# Patient Record
Sex: MALE | Race: WHITE | NOT HISPANIC OR LATINO | Employment: FULL TIME | URBAN - METROPOLITAN AREA
[De-identification: names, ages, dates, MRNs, and addresses within clinical notes are randomized per-mention and may not be internally consistent; named-entity substitution may affect disease eponyms.]

---

## 2022-12-28 ENCOUNTER — OFFICE VISIT (OUTPATIENT)
Dept: URGENT CARE | Facility: CLINIC | Age: 32
End: 2022-12-28

## 2022-12-28 VITALS
HEART RATE: 76 BPM | BODY MASS INDEX: 44.1 KG/M2 | TEMPERATURE: 98.7 F | WEIGHT: 315 LBS | RESPIRATION RATE: 14 BRPM | HEIGHT: 71 IN | OXYGEN SATURATION: 99 %

## 2022-12-28 DIAGNOSIS — L30.9 DERMATITIS: Primary | ICD-10-CM

## 2022-12-28 RX ORDER — PREDNISONE 20 MG/1
TABLET ORAL
Qty: 20 TABLET | Refills: 0 | Status: SHIPPED | OUTPATIENT
Start: 2022-12-28 | End: 2023-01-09

## 2022-12-28 RX ORDER — ESCITALOPRAM OXALATE 10 MG/1
10 TABLET ORAL DAILY
COMMUNITY
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
## 2023-12-27 ENCOUNTER — OFFICE VISIT (OUTPATIENT)
Dept: PHYSICAL THERAPY | Facility: CLINIC | Age: 33
End: 2023-12-27
Payer: COMMERCIAL

## 2023-12-27 DIAGNOSIS — M54.6 ACUTE BILATERAL THORACIC BACK PAIN: Primary | ICD-10-CM

## 2023-12-27 PROCEDURE — 97161 PT EVAL LOW COMPLEX 20 MIN: CPT | Performed by: PHYSICAL THERAPIST
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
## 2024-01-04 ENCOUNTER — OFFICE VISIT (OUTPATIENT)
Dept: PHYSICAL THERAPY | Facility: CLINIC | Age: 34
End: 2024-01-04
Payer: COMMERCIAL

## 2024-01-04 DIAGNOSIS — M54.6 ACUTE BILATERAL THORACIC BACK PAIN: Primary | ICD-10-CM

## 2024-01-04 PROCEDURE — 97110 THERAPEUTIC EXERCISES: CPT | Performed by: PHYSICAL THERAPIST

## 2024-01-04 NOTE — PROGRESS NOTES
Daily Note     Today's date: 2024  Patient name: Mehran Curran  : 1990  MRN: [id number]  Referring provider: Vitaly Spicer  Dx:   Encounter Diagnosis     ICD-10-CM    1. Acute bilateral thoracic back pain  M54.6                      Subjective: Pt reports he is overall doing alright, he has 4/10 pain at present across midback. Reports he is doing primarily seated thoracic stretches. He acknowledges he is able to correct his posture but often finds he needs to correct it as he has absent mindedly resolved into a slouch.       Objective: See treatment diary below. Added strengthening for postural stabilizers, HEP updated.       Assessment: Tolerated treatment well. Patient symptoms abolish with rep thoracic ext in sitting with fulcrum in lower thoracic spine. Pt demo great technique with standing therex and ongoing good prognosis.       Plan: Continue per plan of care.      Eval/ Re-eval Auth #/ Referral # Total visits Start date  Expiration date Total active units  Total manual units  PT only or  PT+OT?    requested                                                        Date:           Total authorized units:  Active:            Manual:           Total remaining units:  Active:               Manual:                Date:           Total authorized units: Active:            Manual:           Total remaining units:  Active:               Manual:                    Precautions: Standard.       Visit 1 2      24                          Neuro Re-Ed       Posture Edu rev     SOC x10 Rev                                        Ther Ex       Rep TS ext Seated x10    Standing elbows up wall x10 Seated 3x10    Standing 1x10     Rows  Black TB 2x12     LPD  Blue 2x12                                        Ther Activity                                          Modalities

## 2024-01-09 ENCOUNTER — OFFICE VISIT (OUTPATIENT)
Dept: PHYSICAL THERAPY | Facility: CLINIC | Age: 34
End: 2024-01-09
Payer: COMMERCIAL

## 2024-01-09 DIAGNOSIS — M54.6 ACUTE BILATERAL THORACIC BACK PAIN: Primary | ICD-10-CM

## 2024-01-09 PROCEDURE — 97110 THERAPEUTIC EXERCISES: CPT | Performed by: PHYSICAL THERAPIST

## 2024-01-09 NOTE — PROGRESS NOTES
Daily Note     Today's date: 2024  Patient name: Mehran Curran  : 1990  MRN: [id number]  Referring provider: Vitaly Spicer  Dx:   Encounter Diagnosis     ICD-10-CM    1. Acute bilateral thoracic back pain  M54.6                      Subjective: Pt reports he felt sore after last session adding new stretches, even over the weekend not at work. It leaves him with a sensation that he wants to stretch or crack his back.       Objective: See treatment diary below, baseline 5/10 midline (just off to right) upper lumbar lower thoracic discomfort. Added open books to HEP.       Assessment: Tolerated treatment well. Post session pain is absent but exercise soreness noticeable.       Plan: Continue per plan of care.      Eval/ Re-eval Auth #/ Referral # Total visits Start date  Expiration date Total active units  Total manual units  PT only or  PT+OT?    requested                                                        Date:           Total authorized units:  Active:            Manual:           Total remaining units:  Active:               Manual:                Date:           Total authorized units: Active:            Manual:           Total remaining units:  Active:               Manual:                    Precautions: Standard.       Visit 1 2 3     24                         Neuro Re-Ed       Posture Edu rev     SOC x10 Rev                                        Ther Ex       Rep TS ext Seated x10    Standing elbows up wall x10 Seated 3x10    Standing 1x10 Seated 3x10    Standing with straight arms 2x10    Rows  Black TB 2x12 1x12    LPD  Blue 2x12 1x12    Supine OH str over fulcrum   5s x 10 1 towel roll    Open books   X10 ea                         Ther Activity                                          Modalities

## 2024-01-16 ENCOUNTER — OFFICE VISIT (OUTPATIENT)
Dept: PHYSICAL THERAPY | Facility: CLINIC | Age: 34
End: 2024-01-16
Payer: COMMERCIAL

## 2024-01-16 DIAGNOSIS — M54.6 ACUTE BILATERAL THORACIC BACK PAIN: Primary | ICD-10-CM

## 2024-01-16 PROCEDURE — 97110 THERAPEUTIC EXERCISES: CPT | Performed by: PHYSICAL THERAPIST

## 2024-01-16 NOTE — PROGRESS NOTES
Daily Note     Today's date: 2024  Patient name: Mehran Curran  : 1990  MRN: [id number]  Referring provider: Vitaly Spicer  Dx:   Encounter Diagnosis     ICD-10-CM    1. Acute bilateral thoracic back pain  M54.6                      Subjective: Pt reports he was not very active all weekend, he thought his back was bothering him more this weekend. Pt would notice discomfort upon waking and then also after a nap during day (pt uses a CPAP so he sleeps on his back for the most part).       Objective: See treatment diary below. Symptoms consistently reduce and do not return with rep TS ext in sitting. Therex progressed today. Add supine B shldr flexion to HEP 3x/week.       Assessment: Tolerated treatment well. Patient cont to demo good tolerance with therex through session, and good overall response to POC. Ongoing symptoms outside of clinic correlate to sustained postures outside of ideal sitting alignment.       Plan: Continue per plan of care.      Eval/ Re-eval Auth #/ Referral # Total visits Start date  Expiration date Total active units  Total manual units  PT only or  PT+OT?    Auth #[id number] approved. 12 visits. 23 to 3/27/24                      Precautions: Standard.       Visit 1 2 3 4    24                        Neuro Re-Ed       Posture Edu rev     SOC x10 Rev                                        Ther Ex       Rep TS ext Seated x10    Standing elbows up wall x10 Seated 3x10    Standing 1x10 Seated 3x10    Standing with straight arms 2x10 Seated 3x10 t/o session   Rows  Black TB 2x12 1x12 2x10 CC 12.5lbs   LPD  Blue 2x12 1x12 2x10 CC 12.5lbs   Supine OH str over fulcrum   5s x 10 1 towel roll 5s x 10 4lbs on wand   Open books   X10 ea 2x10 ea   Pallof press    7.5lbs CC 2x10 ea   Wall slide lift offs    2x12          Ther Activity       Suitcase carry     18lb ' ea                               Modalities

## 2024-01-18 ENCOUNTER — OFFICE VISIT (OUTPATIENT)
Dept: URGENT CARE | Facility: CLINIC | Age: 34
End: 2024-01-18
Payer: COMMERCIAL

## 2024-01-18 ENCOUNTER — APPOINTMENT (OUTPATIENT)
Dept: PHYSICAL THERAPY | Facility: CLINIC | Age: 34
End: 2024-01-18
Payer: COMMERCIAL

## 2024-01-18 VITALS
BODY MASS INDEX: 46.86 KG/M2 | WEIGHT: 315 LBS | RESPIRATION RATE: 16 BRPM | HEART RATE: 79 BPM | TEMPERATURE: 98.7 F | OXYGEN SATURATION: 97 %

## 2024-01-18 DIAGNOSIS — L72.9 CYST OF SKIN: Primary | ICD-10-CM

## 2024-01-18 PROCEDURE — 99213 OFFICE O/P EST LOW 20 MIN: CPT | Performed by: PHYSICIAN ASSISTANT

## 2024-01-18 RX ORDER — SEMAGLUTIDE 2.68 MG/ML
INJECTION, SOLUTION SUBCUTANEOUS
COMMUNITY
Start: 2023-12-26

## 2024-01-18 RX ORDER — SULFAMETHOXAZOLE AND TRIMETHOPRIM 800; 160 MG/1; MG/1
1 TABLET ORAL EVERY 12 HOURS SCHEDULED
Qty: 10 TABLET | Refills: 0 | Status: SHIPPED | OUTPATIENT
Start: 2024-01-18 | End: 2024-01-23

## 2024-01-18 RX ORDER — SULFAMETHOXAZOLE AND TRIMETHOPRIM 800; 160 MG/1; MG/1
1 TABLET ORAL EVERY 12 HOURS SCHEDULED
Qty: 10 TABLET | Refills: 0 | Status: SHIPPED | OUTPATIENT
Start: 2024-01-18 | End: 2024-01-18

## 2024-01-18 RX ORDER — SEMAGLUTIDE 1.34 MG/ML
INJECTION, SOLUTION SUBCUTANEOUS
COMMUNITY
Start: 2024-01-02

## 2024-01-19 NOTE — PROGRESS NOTES
[facility]        NAME: Mehran Curran is a 33 y.o. male  : 1990    MRN: [id number]  DATE: 2024  TIME: 8:01 PM    Assessment and Plan   Cyst of skin [L72.9]  1. Cyst of skin  General Surgery    sulfamethoxazole-trimethoprim (BACTRIM DS) 800-160 mg per tablet    DISCONTINUED: sulfamethoxazole-trimethoprim (BACTRIM DS) 800-160 mg per tablet        Suspect a cyst on the patient's back.  Recommend follow-up with general surgery or Derm for removal.  Will do Bactrim since the skin is red and patient is worried for infection.    Patient Instructions     Patient Instructions   Follow up with general surgery       Follow up with PCP in 3-5 days.  Proceed to  ER if symptoms worsen.    Chief Complaint     Chief Complaint   Patient presents with    Mass     Pt presents with a cyst on back ongoing for several years; upper back redness, lump noted         History of Present Illness       The patient is a 33-year-old male with a hx of DM presenting today for a cyst on his back that has been ongoing for several years.  Today he noticed that it was red and draining.  Pain is a 2/10.  He also reports noticing a foul odor from his back.  However, he did recently increase his dose of Ozempic and has been experiencing night sweats.  He admits that there has been a clear fluid from the back.  Denies any other symptoms.        Review of Systems   Review of Systems   Constitutional:  Negative for chills and fever.   Skin:  Positive for color change.        Erythema on the patient's back.         Current Medications       Current Outpatient Medications:     escitalopram (LEXAPRO) 10 mg tablet, Take 10 mg by mouth daily, Disp: , Rfl:     Ozempic, 1 MG/DOSE, 4 MG/3ML injection pen, INJECT (1MG) BY SUBCUTANEOUS ROUTE EVERY WEEK ON THE SAME DAY OF EACH WEEK, Disp: , Rfl:     Ozempic, 2 MG/DOSE, 8 MG/3ML injection pen, INJECT (2MG) BY SUBCUTANEOUS ROUTE EVERY WEEK ON THE SAME DAY OF EACH WEEK, Disp: , Rfl:      sulfamethoxazole-trimethoprim (BACTRIM DS) 800-160 mg per tablet, Take 1 tablet by mouth every 12 (twelve) hours for 5 days, Disp: 10 tablet, Rfl: 0    Current Allergies     Allergies as of 01/18/2024 - Reviewed 01/18/2024   Allergen Reaction Noted    Amoxicillin Hives 12/28/2022            The following portions of the patient's history were reviewed and updated as appropriate: allergies, current medications, past family history, past medical history, past social history, past surgical history and problem list.     Past Medical History:   Diagnosis Date    Diabetes mellitus (HCC)     PARDEEP (generalized anxiety disorder)        History reviewed. No pertinent surgical history.    History reviewed. No pertinent family history.      Medications have been verified.        Objective   Pulse 79   Temp 98.7 °F (37.1 °C)   Resp 16   Wt (!) 152 kg (336 lb)   SpO2 97%   BMI 46.86 kg/m²        Physical Exam     Physical Exam  Vitals and nursing note reviewed.   Constitutional:       General: He is not in acute distress.     Appearance: Normal appearance. He is normal weight. He is not ill-appearing, toxic-appearing or diaphoretic.   Cardiovascular:      Rate and Rhythm: Normal rate and regular rhythm.      Heart sounds: No murmur heard.     No friction rub. No gallop.   Pulmonary:      Effort: Pulmonary effort is normal. No respiratory distress.      Breath sounds: Normal breath sounds. No stridor. No wheezing, rhonchi or rales.   Chest:      Chest wall: No tenderness.   Skin:     Findings: Erythema present.      Comments: 2 cm by 2 cm round smooth lump on the pt's back with erythema. Scant clear liquid expressed from the back. No purulent drainage.    Neurological:      Mental Status: He is alert.

## 2024-01-23 ENCOUNTER — APPOINTMENT (OUTPATIENT)
Dept: PHYSICAL THERAPY | Facility: CLINIC | Age: 34
End: 2024-01-23
Payer: COMMERCIAL

## 2024-03-26 ENCOUNTER — EVALUATION (OUTPATIENT)
Dept: PHYSICAL THERAPY | Facility: CLINIC | Age: 34
End: 2024-03-26
Payer: COMMERCIAL

## 2024-03-26 DIAGNOSIS — M54.6 ACUTE BILATERAL THORACIC BACK PAIN: Primary | ICD-10-CM

## 2024-03-26 PROCEDURE — 97110 THERAPEUTIC EXERCISES: CPT | Performed by: PHYSICAL THERAPIST

## 2024-03-26 NOTE — PROGRESS NOTES
Daily Note - Progress Note    Today's date: 3/26/2024  Patient name: Mehran Curran  : 1990  MRN: [id number]  Referring provider: Vitaly Spicer  Dx:   Encounter Diagnosis     ICD-10-CM    1. Acute bilateral thoracic back pain  M54.6                      Subjective: pt has had a hiatus in care because he had a cyst removed and required wound care for extended time period. His did notice a reduction in symptoms temporarily however they have returned as of late with a change in activity sitting in hospital chairs with his father who is ill. Pt has returned to the gym which was an intiial goal. He experiences soreness from the gym, he relates it to work       Objective: See treatment diary below. Thoracic AROM WFL t/o with min loss ext. LTG partially met. Posture nil lordosis, inc thoracic kyphosis. Dec paraspinal strength (+)      Assessment: Tolerated treatment well. Patient demo ongoing need for postural stabilization and maximizing response from repeated motions in directional preference. At this time pt is below his PLOF and while he is indeed improving pt would benefit from ongoing skilled PT to maximize return to PLOF.       Plan: Continue per plan of care.      Eval/ Re-eval Auth #/ Referral # Total visits Start date  Expiration date Total active units  Total manual units  PT only or  PT+OT?    Auth #[id number] approved. 12 visits. 23 to 3/27/24                      Precautions: Standard.       Visit 1 2 3 4 5- PN    12/27 1/4/24 1/9/24 1/16/24 3/26/24                           Neuro Re-Ed        Posture Edu rev   Rev   SOC x10 Rev   Rev                                           Ther Ex        Rep TS ext Seated x10    Standing elbows up wall x10 Seated 3x10    Standing 1x10 Seated 3x10    Standing with straight arms 2x10 Seated 3x10 t/o session 1x10        Standing elbow sup wall 2x10   Rows  Black TB 2x12 1x12 2x10 CC 12.5lbs Uni CC 12.5lbs 2x10 ea   LPD  Blue 2x12 1x12 2x10 CC 12.5lbs 9lbs  2x10 uni ea   Supine OH str over fulcrum   5s x 10 1 towel roll 5s x 10 4lbs on wand    Open books   X10 ea 2x10 ea 2x12 ea   Pallof press    7.5lbs CC 2x10 ea    Wall slide lift offs    2x12            Ther Activity        Suitcase carry     18lb ' ea                                    Modalities

## 2024-04-02 ENCOUNTER — APPOINTMENT (OUTPATIENT)
Dept: PHYSICAL THERAPY | Facility: CLINIC | Age: 34
End: 2024-04-02
Payer: COMMERCIAL

## 2024-04-11 ENCOUNTER — OFFICE VISIT (OUTPATIENT)
Dept: PHYSICAL THERAPY | Facility: CLINIC | Age: 34
End: 2024-04-11
Payer: COMMERCIAL

## 2024-04-11 DIAGNOSIS — M54.6 ACUTE BILATERAL THORACIC BACK PAIN: Primary | ICD-10-CM

## 2024-04-11 PROCEDURE — 97110 THERAPEUTIC EXERCISES: CPT | Performed by: PHYSICAL THERAPIST

## 2024-04-11 NOTE — PROGRESS NOTES
Daily Note     Today's date: 2024  Patient name: Mehran Curran  : 1990  MRN: [id number]  Referring provider: Vitaly Spicer  Dx:   Encounter Diagnosis     ICD-10-CM    1. Acute bilateral thoracic back pain  M54.6                      Subjective: Pt has been feeling good overall without much back pain but does correlate this to not being at work much lately due to a very unfortunate loss of a family member recently. No Pain today. Pt confirms good challenge with therex,       Objective: See treatment diary below      Assessment: Tolerated treatment well. Patient cont to demo good yanet to therex and DP based HEP at this time. Will assess once pt returns to work. Cont to emphasize preventative stretching upon RTW.       Plan:  F/u in 1 week     Eval/ Re-eval Auth #/ Referral # Total visits Start date  Expiration date Total active units  Total manual units  PT only or  PT+OT?    Auth #[id number] approved. 12 visits. 23 to 3/27/24           Auth Extended to 2024            Precautions: Standard.       Visit 1 2 3 4 5- PN 6    12/27 1/4/24 1/9/24 1/16/24 3/26/24 4/11/24                              Neuro Re-Ed         Posture Edu rev   Rev Rev   SOC x10 Rev   Rev                                                 Ther Ex         Rep TS ext Seated x10    Standing elbows up wall x10 Seated 3x10    Standing 1x10 Seated 3x10    Standing with straight arms 2x10 Seated 3x10 t/o session 1x10        Standing elbow sup wall 2x10 X10        2x10   Rows  Black TB 2x12 1x12 2x10 CC 12.5lbs Uni CC 12.5lbs 2x10 ea      LPD  Blue 2x12 1x12 2x10 CC 12.5lbs 9lbs 2x10 uni ea    Supine OH str over fulcrum   5s x 10 1 towel roll 5s x 10 4lbs on wand     Open books   X10 ea 2x10 ea 2x12 ea 2x12 ea   Pallof press    7.5lbs CC 2x10 ea  2x12 ea   Wall slide lift offs    2x12     Sq + Ext      Ball on wall Squat with ext 2x10   Ther Activity         Suitcase carry     18lb ' ea                                          Modalities

## 2024-04-16 ENCOUNTER — APPOINTMENT (OUTPATIENT)
Dept: PHYSICAL THERAPY | Facility: CLINIC | Age: 34
End: 2024-04-16
Payer: COMMERCIAL

## 2024-04-25 ENCOUNTER — OFFICE VISIT (OUTPATIENT)
Dept: PHYSICAL THERAPY | Facility: CLINIC | Age: 34
End: 2024-04-25
Payer: COMMERCIAL

## 2024-04-25 DIAGNOSIS — M54.6 ACUTE BILATERAL THORACIC BACK PAIN: Primary | ICD-10-CM

## 2024-04-25 PROCEDURE — 97110 THERAPEUTIC EXERCISES: CPT

## 2024-04-25 NOTE — PROGRESS NOTES
"Daily Note     Today's date: 2024  Patient name: Mehran Curran  : 1990  MRN: [id number]  Referring provider: Vitaly Spicer  Dx:   Encounter Diagnosis     ICD-10-CM    1. Acute bilateral thoracic back pain  M54.6           Start Time:   Stop Time:   Total time in clinic (min): 35 minutes    Subjective: Pt states he's been \"feeling pretty good\". He states he has been doing his exercises/stretches at home and is working on his posture at work. He also states he has been planning on starting a workout routine at the gym.       Objective: See treatment diary below      Assessment: Incorporated core exercises into today's session, which pt tolerated well with no increase in pain. Reviewed posture awareness and incorporating stretched into work routine. Pt has been progressing very well and is becoming independent on his exercises. Plan to hold PT for up to 2 weeks and call to discuss further plan, if needed.     Plan:  Plan to hold PT sessions for next two weeks and follow-up to discuss further plans.      Eval/ Re-eval Auth #/ Referral # Total visits Start date  Expiration date Total active units  Total manual units  PT only or  PT+OT?    Auth #[id number] approved. 12 visits. 23 to 3/27/24           Auth Extended to 2024            Precautions: Standard.       Visit 2 3 4 5- PN 6 7    1/4/24 1/9/24 1/16/24 3/26/24 4/11/24 4/25/24                              Neuro Re-Ed         Posture rev   Rev Rev Rev   SOC Rev   Rev                                                  Ther Ex         Rep TS ext Seated 3x10    Standing 1x10 Seated 3x10    Standing with straight arms 2x10 Seated 3x10 t/o session 1x10        Standing elbow sup wall 2x10 X10        2x10 Seated 3x10   Rows Black TB 2x12 1x12 2x10 CC 12.5lbs Uni CC 12.5lbs 2x10 ea    CC 17.5#  2x10   LPD Blue 2x12 1x12 2x10 CC 12.5lbs 9lbs 2x10 uni ea     Supine OH str over fulcrum  5s x 10 1 towel roll 5s x 10 4lbs on wand      Open books  " "X10 ea 2x10 ea 2x12 ea 2x12 ea 2x12 ea   Pallof press   7.5lbs CC 2x10 ea  2x12 ea 2x12 ea  17.5#   Wall slide lift offs   2x12      Sq + Ext     Ball on wall Squat with ext 2x10 Ball on wall squat with ext  2x10   LTR      10\" x20   Supine single arm KB chest press       2x12 9# bl   TrA progressions      Ball squeeze 10\"x10   Ther Activity         Suitcase carry    18lb ' ea      Water tank carry      20lb   Hallway  3 laps                              Modalities                                          "